# Patient Record
Sex: FEMALE | Race: WHITE | Employment: OTHER | URBAN - METROPOLITAN AREA
[De-identification: names, ages, dates, MRNs, and addresses within clinical notes are randomized per-mention and may not be internally consistent; named-entity substitution may affect disease eponyms.]

---

## 2021-09-06 ENCOUNTER — HOSPITAL ENCOUNTER (EMERGENCY)
Age: 71
Discharge: HOME | End: 2021-09-06
Attending: EMERGENCY MEDICINE
Payer: MEDICARE

## 2021-09-06 ENCOUNTER — APPOINTMENT (OUTPATIENT)
Dept: GENERAL RADIOLOGY | Age: 71
End: 2021-09-06
Payer: MEDICARE

## 2021-09-06 VITALS
WEIGHT: 197 LBS | DIASTOLIC BLOOD PRESSURE: 84 MMHG | RESPIRATION RATE: 15 BRPM | HEART RATE: 81 BPM | OXYGEN SATURATION: 97 % | SYSTOLIC BLOOD PRESSURE: 149 MMHG | BODY MASS INDEX: 34.91 KG/M2 | TEMPERATURE: 97.7 F | HEIGHT: 63 IN

## 2021-09-06 DIAGNOSIS — J06.9 VIRAL URI WITH COUGH: Primary | ICD-10-CM

## 2021-09-06 DIAGNOSIS — U07.1 COVID-19: ICD-10-CM

## 2021-09-06 DIAGNOSIS — R09.81 SINUS CONGESTION: ICD-10-CM

## 2021-09-06 PROCEDURE — 99283 EMERGENCY DEPT VISIT LOW MDM: CPT

## 2021-09-06 PROCEDURE — 71045 X-RAY EXAM CHEST 1 VIEW: CPT

## 2021-09-06 PROCEDURE — U0003 INFECTIOUS AGENT DETECTION BY NUCLEIC ACID (DNA OR RNA); SEVERE ACUTE RESPIRATORY SYNDROME CORONAVIRUS 2 (SARS-COV-2) (CORONAVIRUS DISEASE [COVID-19]), AMPLIFIED PROBE TECHNIQUE, MAKING USE OF HIGH THROUGHPUT TECHNOLOGIES AS DESCRIBED BY CMS-2020-01-R: HCPCS

## 2021-09-06 PROCEDURE — U0005 INFEC AGEN DETEC AMPLI PROBE: HCPCS

## 2021-09-06 RX ORDER — AZATHIOPRINE 50 MG/1
50 TABLET ORAL DAILY
COMMUNITY

## 2021-09-06 RX ORDER — BENZONATATE 100 MG/1
100 CAPSULE ORAL 3 TIMES DAILY PRN
Qty: 10 CAPSULE | Refills: 0 | Status: SHIPPED | OUTPATIENT
Start: 2021-09-06 | End: 2021-09-13

## 2021-09-06 ASSESSMENT — PAIN DESCRIPTION - DESCRIPTORS: DESCRIPTORS: ACHING

## 2021-09-06 ASSESSMENT — PAIN DESCRIPTION - PAIN TYPE: TYPE: ACUTE PAIN

## 2021-09-06 ASSESSMENT — PAIN SCALES - GENERAL: PAINLEVEL_OUTOF10: 7

## 2021-09-06 NOTE — ED PROVIDER NOTES
CHIEF COMPLAINT  Chief Complaint   Patient presents with    Fever       HPI  Zuleima Soto is a 66 y.o. female with history of myasthenia gravis, breast cancer who presents fever, fatigue, nasal congestion, headache and myalgias after being exposed to family last week that tested positive for Covid. She is concerned that she has Covid. She denies any specific shortness of breath, she has a mild, nonproductive cough. She denies any abdominal pain. She denies any worsening her baseline intention tremor. She denies any focal weakness or numbness that is changed for her myasthenia gravis. She just wants to be cautious because of her myasthenia gravis and does not want to miss anything. She is otherwise feeling well hydrated but fatigue. Signs and symptoms over the past 2 days, persistent until time of evaluation here.       REVIEW OF SYSTEMS  Review of Systems   History obtained from chart review and the patient  General ROS: positive for  - fever  Ophthalmic ROS: negative for - decreased vision or double vision  ENT ROS: positive for - headaches, nasal congestion and nasal discharge  Hematological and Lymphatic ROS: negative for - weight loss  Endocrine ROS: negative for - unexpected weight changes  Respiratory ROS: positive for - cough  Cardiovascular ROS: no chest pain or dyspnea on exertion  Gastrointestinal ROS: no abdominal pain, change in bowel habits, or black or bloody stools  Genito-Urinary ROS: no dysuria, trouble voiding, or hematuria  Musculoskeletal ROS: positive for -body aches  Neurological ROS: no TIA or stroke symptoms      PAST MEDICAL HISTORY  Past Medical History:   Diagnosis Date    [facility] [address]     COVID-19 09/06/2021    Myasthenia ([facility] [address]        FAMILY HISTORY  Family History   Problem Relation Age of Onset    Cancer Mother     Hypertension Mother     Cancer Father        SOCIAL HISTORY  Social History     Socioeconomic History    Marital status: Single     Spouse name: None  Number of children: None    Years of education: None    Highest education level: None   Occupational History    Occupation: pharmacy tech     Comment: [employer]   Tobacco Use    Smoking status: Never Smoker    Smokeless tobacco: Never Used   Vaping Use    Vaping Use: Never used   Substance and Sexual Activity    Alcohol use: Yes     Comment: 1-2 per year    Drug use: No    Sexual activity: Never   Other Topics Concern    None   Social History Narrative    None     Social Determinants of Health     Financial Resource Strain:     Difficulty of Paying Living Expenses:    Food Insecurity:     Worried About Running Out of Food in the Last Year:     Ran Out of Food in the Last Year:    Transportation Needs:     Lack of Transportation (Medical):  Lack of Transportation (Non-Medical):    Physical Activity:     Days of Exercise per Week:     Minutes of Exercise per Session:    Stress:     Feeling of Stress :    Social Connections:     Frequency of Communication with Friends and Family:     Frequency of Social Gatherings with Friends and Family:     Attends [employer] Services:     Active Member of Clubs or Organizations:     Attends Club or Organization Meetings:     Marital Status:    Intimate Partner Violence:     Fear of Current or Ex-Partner:     Emotionally Abused:     Physically Abused:     Sexually Abused:        SURGICAL HISTORY  Past Surgical History:   Procedure Laterality Date    MASTECTOMY, RADICAL Bilateral        CURRENT MEDICATIONS  No current facility-administered medications on file prior to encounter.      Current Outpatient Medications on File Prior to Encounter   Medication Sig Dispense Refill    azaTHIOprine (IMURAN) 50 MG tablet Take 50 mg by mouth daily Takes 100 mg daily           ALLERGIES  Allergies   Allergen Reactions    Morphine Rash       PHYSICAL EXAM  VITAL SIGNS: BP (!) 149/84   Pulse 81   Temp 97.7 °F (36.5 °C) (Skin)   Resp 15   Ht 5' 3\" (1.6 m)   Wt 197 lb (89.4 kg)   SpO2 97%   BMI 34.90 kg/m²   Constitutional: Well developed, Well nourished, active, resting in bed, pleasant  HENT: Normocephalic, Atraumatic, Bilateral external ears normal, Oropharynx moist, No oral exudates, Nose mildly congested. Posterior oropharynx mildly erythematous without exudate, PTA or RPA  Eyes: PERRL, EOMI, Conjunctiva normal, No discharge. Neck: Normal range of motion, Supple, No stridor. Cardiovascular: Normal heart rate, Normal rhythm, No murmurs, No rubs, No gallops. Thorax & Lungs: Normal breath sounds, No respiratory distress, No wheezing, No chest tenderness. Abdomen: Bowel sounds normal, Soft, No tenderness, no guarding, no rebound, No masses, No pulsatile masses. Skin: Warm, Dry, No erythema, No rash. Extremities: Intact distal pulses, No edema, No tenderness, No cyanosis, No clubbing. Musculoskeletal: Good gross range of motion in all major joints. No major deformities noted. Neurologic: Alert & oriented x 3, Normal gross motor function, Normal gross sensory function, No focal deficits noted. Intention tremor noted that stills on focus, ambulates with non-ataxic, nonantalgic gait  Psychiatric: Affect normal with mood situationally anxious        RADIOLOGY/PROCEDURES/LABS  Last Imaging results   XR CHEST PORTABLE   Final Result   No acute abnormality. Imaging reviewed by myself    Labs Reviewed   COVID-19 - Abnormal; Notable for the following components:       Result Value    SARS-CoV-2 DETECTED (*)     All other components within normal limits         Medications - No data to display    [facility]  Pertinent Labs & Imaging studies reviewed. (See chart for details)    67-year-old female presents with feeling generalizability unwell. Her signs and symptoms are most suggestive of Covid and she has a known positive exposure in her household at home.   She will be written a pulse ox to monitor reasons to return and she was taught very specific reasons for which to return. She is encouraged to stay hydrated, to use symptomatic control, symptom control will be prescribed. Here she is hemodynamically stable, not requiring supplemental oxygen, she appears well-hydrated and is pleasant and at her neurologic baseline. She is agreeable to return with new worsening signs or symptoms, she is discharged to quarantine. She is agreeable with plan of care. FINAL IMPRESSION  Problem List Items Addressed This Visit     None      Visit Diagnoses     Viral URI with cough    -  Primary    Relevant Orders    DME Order for Pulse Ox as OP    Sinus congestion        COVID-19          1.    2.   3.    Patient gave me permission to discuss medical history, care, and plan with those present in the room.   Electronically signed by: Sita Dutton MD, 9/8/2021  MD Sita Acevedo MD  09/08/21 7452

## 2021-09-06 NOTE — ED TRIAGE NOTES
Pt arrives with complaints of cough, and headache, states her cough is dry, she reports that her daughter tested positve for covid yesterday, she states she wants to make sure she does not need an antibiotic for her chest. Vaginal delivery

## 2021-09-06 NOTE — ED NOTES
The patient was discharged to home in stable condition. All discharge instructions, medications and follow up appointments were reviewed with the patient and or significant other. Any and all concerns if voiced were addressed. The patient was instructed to return for worsening symptoms and any other concerns.         Damian Jackson RN  09/06/21 1427

## 2021-09-07 ENCOUNTER — CARE COORDINATION (OUTPATIENT)
Dept: CARE COORDINATION | Age: 71
End: 2021-09-07

## 2021-09-07 LAB — SARS-COV-2: DETECTED

## 2021-09-07 NOTE — CARE COORDINATION
[facility] ED Follow Up Call    2021    Patient: Manolo Santamaria Patient : 1950   MRN: <[id number]>  Reason for Admission:  COVID-19+  Discharge Date: 21    Patient contacted regarding COVID-19 risk, exposure, diagnosis, pulse oximeter ordered at discharge and monoclonal antibody infusion follow up. Discussed COVID-19 related testing which was available at this time. Test results were positive. Patient informed of results, if available? Yes. Ambulatory Care Manager contacted the patient by telephone to perform post discharge assessment. Call within 2 business days of discharge: Yes. Verified name and  with patient as identifiers. Provided introduction to self, and explanation of the CTN/ACM role, and reason for call due to risk factors for infection and/or exposure to COVID-19. Symptoms reviewed with patient who verbalized the following symptoms: fatigue; occasional dizziness. Encouraged rest/ hydration. Instructed patient to avoid sudden changes in position. Reviewed worsening symptoms to report to MD.       Due to no new or worsening symptoms encounter was not routed to provider for escalation. Discussed follow-up appointments. If no appointment was previously scheduled, appointment scheduling offered: Yes. Instructed on the [facility] as a bridge to Primary Care. Patient declined appt. Verbalized her plan to follow up with Neurologist whom patient follows as PCP. [facility] follow up appointment(s): No future appointments. Non-[facility] follow up appointment(s):     Non-face-to-face services provided:  Education of patient/family/caregiver/guardian to support self-management-1     Advance Care Planning:   Does patient have an Advance Directive:  not on file. Educated patient about risk for severe COVID-19 due to risk factors according to CDC guidelines. ACM reviewed discharge instructions, medical action plan and red flag symptoms with the patient who verbalized understanding. Discussed COVID vaccination status: Yes. Education provided on COVID-19 vaccination as appropriate. Discussed exposure protocols and quarantine with CDC Guidelines. Patient was given an opportunity to verbalize any questions and concerns and agrees to contact ACM or health care provider for questions related to their healthcare. Reviewed and educated patient on any new and changed medications related to discharge diagnosis. Patient reports that she has her medications and is taking as directed. Patient denies supply/ support needs. Contact information for the local  offered should needs arise. Was patient discharged with a pulse oximeter? Yes Discussed and confirmed pulse oximeter discharge instructions and when to notify provider or seek emergency care. ACM provided contact information. Plan for follow-up call in 5-7 days based on severity of symptoms and risk factors.         Care Transitions ED Follow Up    Care Transitions Interventions

## 2021-09-13 ENCOUNTER — CARE COORDINATION (OUTPATIENT)
Dept: CARE COORDINATION | Age: 71
End: 2021-09-13

## 2021-09-13 NOTE — CARE COORDINATION
Attempted to reach patient for [facility] ER follow up. No answer to phone. Message left with M contact information and request for call back.

## 2023-02-09 ENCOUNTER — HOSPITAL ENCOUNTER (EMERGENCY)
Age: 73
Discharge: HOME | End: 2023-02-09
Attending: EMERGENCY MEDICINE
Payer: MEDICARE

## 2023-02-09 ENCOUNTER — APPOINTMENT (OUTPATIENT)
Dept: CT IMAGING | Age: 73
End: 2023-02-09
Payer: MEDICARE

## 2023-02-09 ENCOUNTER — APPOINTMENT (OUTPATIENT)
Dept: GENERAL RADIOLOGY | Age: 73
End: 2023-02-09
Payer: MEDICARE

## 2023-02-09 VITALS
TEMPERATURE: 97.9 F | HEART RATE: 112 BPM | OXYGEN SATURATION: 93 % | SYSTOLIC BLOOD PRESSURE: 118 MMHG | RESPIRATION RATE: 16 BRPM | HEIGHT: 63 IN | DIASTOLIC BLOOD PRESSURE: 90 MMHG | WEIGHT: 209.2 LBS | BODY MASS INDEX: 37.07 KG/M2

## 2023-02-09 DIAGNOSIS — U07.1 COVID: Primary | ICD-10-CM

## 2023-02-09 LAB
BASOPHILS ABSOLUTE: 0 K/CU MM
BASOPHILS RELATIVE PERCENT: 0.4 % (ref 0–1)
D DIMER: 1.49 UG/ML (FEU)
DIFFERENTIAL TYPE: ABNORMAL
EOSINOPHILS ABSOLUTE: 0.1 K/CU MM
EOSINOPHILS RELATIVE PERCENT: 1.1 % (ref 0–3)
HCT VFR BLD CALC: 41.3 % (ref 37–47)
HEMOGLOBIN: 13.9 GM/DL (ref 12.5–16)
IMMATURE NEUTROPHIL %: 0.2 % (ref 0–0.43)
LYMPHOCYTES ABSOLUTE: 0.8 K/CU MM
LYMPHOCYTES RELATIVE PERCENT: 15.7 % (ref 24–44)
MCH RBC QN AUTO: 34.6 PG (ref 27–31)
MCHC RBC AUTO-ENTMCNC: 33.7 % (ref 32–36)
MCV RBC AUTO: 102.7 FL (ref 78–100)
MONOCYTES ABSOLUTE: 0.6 K/CU MM
MONOCYTES RELATIVE PERCENT: 11.3 % (ref 0–4)
PDW BLD-RTO: 12.4 % (ref 11.7–14.9)
PLATELET # BLD: 123 K/CU MM (ref 140–440)
PMV BLD AUTO: 10 FL (ref 7.5–11.1)
RAPID INFLUENZA  B AGN: NEGATIVE
RAPID INFLUENZA A AGN: NEGATIVE
RBC # BLD: 4.02 M/CU MM (ref 4.2–5.4)
SARS-COV-2 RDRP RESP QL NAA+PROBE: DETECTED
SEGMENTED NEUTROPHILS ABSOLUTE COUNT: 3.8 K/CU MM
SEGMENTED NEUTROPHILS RELATIVE PERCENT: 71.3 % (ref 36–66)
SOURCE: ABNORMAL
TOTAL IMMATURE NEUTOROPHIL: 0.01 K/CU MM
TROPONIN T: <0.01 NG/ML
WBC # BLD: 5.3 K/CU MM (ref 4–10.5)

## 2023-02-09 PROCEDURE — 87635 SARS-COV-2 COVID-19 AMP PRB: CPT

## 2023-02-09 PROCEDURE — 93005 ELECTROCARDIOGRAM TRACING: CPT | Performed by: EMERGENCY MEDICINE

## 2023-02-09 PROCEDURE — 2580000003 HC RX 258: Performed by: EMERGENCY MEDICINE

## 2023-02-09 PROCEDURE — 80048 BASIC METABOLIC PNL TOTAL CA: CPT

## 2023-02-09 PROCEDURE — 87804 INFLUENZA ASSAY W/OPTIC: CPT

## 2023-02-09 PROCEDURE — 85379 FIBRIN DEGRADATION QUANT: CPT

## 2023-02-09 PROCEDURE — 85025 COMPLETE CBC W/AUTO DIFF WBC: CPT

## 2023-02-09 PROCEDURE — 84484 ASSAY OF TROPONIN QUANT: CPT

## 2023-02-09 PROCEDURE — 99285 EMERGENCY DEPT VISIT HI MDM: CPT

## 2023-02-09 PROCEDURE — 96361 HYDRATE IV INFUSION ADD-ON: CPT

## 2023-02-09 PROCEDURE — 96360 HYDRATION IV INFUSION INIT: CPT

## 2023-02-09 PROCEDURE — 6360000004 HC RX CONTRAST MEDICATION: Performed by: EMERGENCY MEDICINE

## 2023-02-09 PROCEDURE — 71275 CT ANGIOGRAPHY CHEST: CPT

## 2023-02-09 PROCEDURE — 71045 X-RAY EXAM CHEST 1 VIEW: CPT

## 2023-02-09 RX ORDER — 0.9 % SODIUM CHLORIDE 0.9 %
500 INTRAVENOUS SOLUTION INTRAVENOUS ONCE
Status: COMPLETED | OUTPATIENT
Start: 2023-02-09 | End: 2023-02-09

## 2023-02-09 RX ORDER — BENZONATATE 200 MG/1
200 CAPSULE ORAL 3 TIMES DAILY PRN
Qty: 30 CAPSULE | Refills: 0 | Status: SHIPPED | OUTPATIENT
Start: 2023-02-09 | End: 2023-02-16

## 2023-02-09 RX ORDER — GUAIFENESIN/DEXTROMETHORPHAN 100-10MG/5
5 SYRUP ORAL 4 TIMES DAILY PRN
Qty: 120 ML | Refills: 0 | Status: SHIPPED | OUTPATIENT
Start: 2023-02-09 | End: 2023-02-19

## 2023-02-09 RX ORDER — ALBUTEROL SULFATE 90 UG/1
2 AEROSOL, METERED RESPIRATORY (INHALATION) EVERY 4 HOURS PRN
Qty: 18 G | Refills: 1 | Status: SHIPPED | OUTPATIENT
Start: 2023-02-09 | End: 2023-03-11

## 2023-02-09 RX ORDER — 0.9 % SODIUM CHLORIDE 0.9 %
20 VIAL (ML) INJECTION
Status: COMPLETED | OUTPATIENT
Start: 2023-02-09 | End: 2023-02-09

## 2023-02-09 RX ADMIN — Medication 20 ML: at 19:24

## 2023-02-09 RX ADMIN — IOPAMIDOL 60 ML: 755 INJECTION, SOLUTION INTRAVENOUS at 19:24

## 2023-02-09 RX ADMIN — SODIUM CHLORIDE 500 ML: 9 INJECTION, SOLUTION INTRAVENOUS at 17:56

## 2023-02-09 ASSESSMENT — PAIN - FUNCTIONAL ASSESSMENT: PAIN_FUNCTIONAL_ASSESSMENT: NONE - DENIES PAIN

## 2023-02-09 NOTE — ED PROVIDER NOTES
Patient presents with 2-day history of nonproductive cough, fever to 10 1 in the evening prior to presentation, nausea and vomiting. No diarrhea. No urinary symptoms. No chest pain. Patient endorses dyspnea. No known sick contacts.   Benign exam.    EKG as interpreted by emergency physician: Rate 105, sinus rhythm, normal axis, normal intervals, no acute ST-T wave changes    *** sign out out to oncoming physician at shift change for follow-up of pending studies and appropriate treatment and disposition. Decision To Discharge 02/09/2023 08:04:51 PM     Disposition referral (if applicable):  Beatrix Rear [address] [place] [address]  [phone]    In 3 days            ===================================================================    HPI/Pertinent ROS:  Woody Cevallos is a 75 y.o. female that presents complaining of 2-day history of nonproductive cough, fever to 101 the evening prior to presentation, nausea and vomiting. Patient denies diarrhea. No urinary frequency, urgency, dysuria, hematuria. No chest pain. Patient endorses some mild dyspnea. No known sick contacts. Physical Exam:  Triage VS:    ED Triage Vitals [02/09/23 1725]   Enc Vitals Group      BP (!) 118/90      Heart Rate (!) 112      Resp 16      Temp 97.9 °F (36.6 °C)      Temp Source Oral      SpO2 93 %      Weight 209 lb 3.2 oz (94.9 kg)      Height 5' 3\" (1.6 m)      Head Circumference       Peak Flow       Pain Score       Pain Loc       Pain Edu? Excl. in [address]? General appearance:  No acute distress. Skin:  Warm. Dry. Eye:  Extraocular movements intact. Ears, nose, mouth and throat:  Oral mucosa moist   Neck:  Trachea midline. Extremity:  No swelling. Normal ROM     Heart:  Regular rate and rhythm, normal S1 & S2, no extra heart sounds. Perfusion:  intact  Respiratory:  Lungs clear to auscultation bilaterally. Respirations nonlabored. Abdominal:  Normal bowel sounds. Soft. Nontender. Non distended. Back:  No CVA tenderness to palpation     Neurological:  Alert and oriented times 3. No focal neuro deficits.              Psychiatric:  Appropriate    Past Medical History:   Diagnosis Date    Breast cancer ([facility] [address]     COVID-19 09/06/2021    Myasthenia ([facility] [address]      Past Surgical History:   Procedure Laterality Date    MASTECTOMY, RADICAL Bilateral Family History   Problem Relation Age of Onset    Cancer Mother     Hypertension Mother     Cancer Father      Social History     Socioeconomic History    Marital status: Single     Spouse name: Not on file    Number of children: Not on file    Years of education: Not on file    Highest education level: Not on file   Occupational History    Occupation: pharmacy tech     Comment: [employer]   Tobacco Use    Smoking status: Never    Smokeless tobacco: Never   Vaping Use    Vaping Use: Never used   Substance and Sexual Activity    Alcohol use: Yes     Comment: 1-2 per year    Drug use: No    Sexual activity: Never   Other Topics Concern    Not on file   Social History Narrative    Not on file     Social Determinants of Health     Financial Resource Strain: Not on file   Food Insecurity: Not on file   Transportation Needs: Not on file   Physical Activity: Not on file   Stress: Not on file   Social Connections: Not on file   Intimate Partner Violence: Not on file   Housing Stability: Not on file     No current facility-administered medications for this encounter. Current Outpatient Medications   Medication Sig Dispense Refill    nirmatrelvir/ritonavir 300/100 (PAXLOVID) 20 x 150 MG & 10 x 100MG TBPK Take 3 tablets (two 150 mg nirmatrelvir and one 100 mg ritonavir tablets) by mouth every 12 hours for 5 days. 30 tablet 0    albuterol sulfate HFA (PROVENTIL HFA) 108 (90 Base) MCG/ACT inhaler Inhale 2 puffs into the lungs every 4 hours as needed for Wheezing or Shortness of Breath With spacer (and mask if indicated). Thanks.  18 g 1    benzonatate (TESSALON) 200 MG capsule Take 1 capsule by mouth 3 times daily as needed for Cough 30 capsule 0    guaiFENesin-dextromethorphan (ROBITUSSIN DM) 100-10 MG/5ML syrup Take 5 mLs by mouth 4 times daily as needed for Cough 120 mL 0    azaTHIOprine (IMURAN) 50 MG tablet Take 50 mg by mouth daily Takes 100 mg daily       Allergies   Allergen Reactions    Morphine Rash       Nursing Notes Reviewed    I have reviewed and interpreted all of the currently available lab results from this visit (if applicable):  Results for orders placed or performed during the hospital encounter of 02/09/23   Rapid influenza A/B antigens    Specimen: Nasopharyngeal   Result Value Ref Range    Rapid Influenza A Ag NEGATIVE NEGATIVE    Rapid Influenza B Ag NEGATIVE NEGATIVE   COVID-19, Rapid    Specimen: Nasopharyngeal   Result Value Ref Range    Source NASOPHARYNGEAL SWAB     SARS-CoV-2, NAAT DETECTED (A) NOT DETECTED   CBC with Auto Differential   Result Value Ref Range    WBC 5.3 4.0 - 10.5 K/CU MM    RBC 4.02 (L) 4.2 - 5.4 M/CU MM    Hemoglobin 13.9 12.5 - 16.0 GM/DL    Hematocrit 41.3 37 - 47 %    .7 (H) 78 - 100 FL    MCH 34.6 (H) 27 - 31 PG    MCHC 33.7 32.0 - 36.0 %    RDW 12.4 11.7 - 14.9 %    Platelets 723 (L) 994 - 440 K/CU MM    MPV 10.0 7.5 - 11.1 FL    Differential Type AUTOMATED DIFFERENTIAL     Segs Relative 71.3 (H) 36 - 66 %    Lymphocytes % 15.7 (L) 24 - 44 %    Monocytes % 11.3 (H) 0 - 4 %    Eosinophils % 1.1 0 - 3 %    Basophils % 0.4 0 - 1 %    Segs Absolute 3.8 K/CU MM    Lymphocytes Absolute 0.8 K/CU MM    Monocytes Absolute 0.6 K/CU MM    Eosinophils Absolute 0.1 K/CU MM    Basophils Absolute 0.0 K/CU MM    Immature Neutrophil % 0.2 0 - 0.43 %    Total Immature Neutrophil 0.01 K/CU MM   Basic Metabolic Panel   Result Value Ref Range    Sodium 140 135 - 145 MMOL/L    Potassium 3.9 3.5 - 5.1 MMOL/L    Chloride 104 99 - 110 mMol/L    CO2 26 21 - 32 MMOL/L    Anion Gap 10 4 - 16    BUN 12 6 - 23 MG/DL    Creatinine 0.7 0.6 - 1.1 MG/DL    Est, Glom Filt Rate >60 >60 mL/min/1.73m2    Glucose 100 (H) 70 - 99 MG/DL    Calcium 9.0 8.3 - 10.6 MG/DL   Troponin   Result Value Ref Range    Troponin T <0.010 <0.01 NG/ML   D-Dimer, Rapid   Result Value Ref Range    D-Dimer, Quant 1.49 (H) <0.47 ug/mL (FEU)   EKG 12 Lead   Result Value Ref Range    Ventricular Rate 105 BPM    Atrial Rate 105 BPM P-R Interval 164 ms    QRS Duration 74 ms    Q-T Interval 324 ms    QTc Calculation (Bazett) 428 ms    P Axis 58 degrees    R Axis 48 degrees    T Axis 54 degrees    Diagnosis       Sinus tachycardia  Otherwise normal ECG  No previous ECGs available  Confirmed by Inga Ramirez ([id number]) on 2/10/2023 4:56:54 PM        Radiographs (if obtained):  Radiologist's Report Reviewed:  CTA PULMONARY W CONTRAST   Final Result   1. Limited evaluation for pulmonary embolism. No PE identified to the   proximal segmental pulmonary artery level. 2.  Question of mild gallbladder wall thickening. Sonographic follow-up may   be useful as clinically warranted. 3.  Few noncalcified pulmonary nodules bilaterally. CT chest follow-up   recommended in 6 months to document stability. 4.  Additional findings, as above. XR CHEST PORTABLE   Final Result   No acute cardiopulmonary process. EKG as interpreted by me: Rate 105, sinus rhythm, normal axis, normal intervals, no acute ST-T wave changes    Comment: Please note this report has been produced using speech recognition software and may contain errors related to that system including errors in grammar, punctuation, and spelling, as well as words and phrases that may be inappropriate. Efforts were made to edit the dictations.        Noman Johnson MD  02/13/23 3807

## 2023-02-10 LAB
ANION GAP SERPL CALCULATED.3IONS-SCNC: 10 MMOL/L (ref 4–16)
BUN SERPL-MCNC: 12 MG/DL (ref 6–23)
CALCIUM SERPL-MCNC: 9 MG/DL (ref 8.3–10.6)
CHLORIDE BLD-SCNC: 104 MMOL/L (ref 99–110)
CO2: 26 MMOL/L (ref 21–32)
CREAT SERPL-MCNC: 0.7 MG/DL (ref 0.6–1.1)
EKG ATRIAL RATE: 105 BPM
EKG DIAGNOSIS: NORMAL
EKG P AXIS: 58 DEGREES
EKG P-R INTERVAL: 164 MS
EKG Q-T INTERVAL: 324 MS
EKG QRS DURATION: 74 MS
EKG QTC CALCULATION (BAZETT): 428 MS
EKG R AXIS: 48 DEGREES
EKG T AXIS: 54 DEGREES
EKG VENTRICULAR RATE: 105 BPM
GFR SERPL CREATININE-BSD FRML MDRD: >60 ML/MIN/1.73M2
GLUCOSE SERPL-MCNC: 100 MG/DL (ref 70–99)
POTASSIUM SERPL-SCNC: 3.9 MMOL/L (ref 3.5–5.1)
SODIUM BLD-SCNC: 140 MMOL/L (ref 135–145)

## 2023-02-10 PROCEDURE — 93010 ELECTROCARDIOGRAM REPORT: CPT | Performed by: INTERNAL MEDICINE

## 2023-02-10 NOTE — ED NOTES
Emergency Department Encounter    Patient: Gabby Sosa  MRN: [id number]  : 1950  Date of Evaluation: 2023  ED Provider:  Adrienne Martini DO    Briefly, Gabby Sosa is a 75 y.o. female presented to the emergency department who presents with COVID    I have reviewed and interpreted all of the currently available lab results from this visit (if applicable)  Results for orders placed or performed during the hospital encounter of 23   Rapid influenza A/B antigens    Specimen: Nasopharyngeal   Result Value Ref Range    Rapid Influenza A Ag NEGATIVE NEGATIVE    Rapid Influenza B Ag NEGATIVE NEGATIVE   COVID-19, Rapid    Specimen: Nasopharyngeal   Result Value Ref Range    Source NASOPHARYNGEAL SWAB     SARS-CoV-2, NAAT DETECTED (A) NOT DETECTED   CBC with Auto Differential   Result Value Ref Range    WBC 5.3 4.0 - 10.5 K/CU MM    RBC 4.02 (L) 4.2 - 5.4 M/CU MM    Hemoglobin 13.9 12.5 - 16.0 GM/DL    Hematocrit 41.3 37 - 47 %    .7 (H) 78 - 100 FL    MCH 34.6 (H) 27 - 31 PG    MCHC 33.7 32.0 - 36.0 %    RDW 12.4 11.7 - 14.9 %    Platelets 491 (L) 101 - 440 K/CU MM    MPV 10.0 7.5 - 11.1 FL    Differential Type AUTOMATED DIFFERENTIAL     Segs Relative 71.3 (H) 36 - 66 %    Lymphocytes % 15.7 (L) 24 - 44 %    Monocytes % 11.3 (H) 0 - 4 %    Eosinophils % 1.1 0 - 3 %    Basophils % 0.4 0 - 1 %    Segs Absolute 3.8 K/CU MM    Lymphocytes Absolute 0.8 K/CU MM    Monocytes Absolute 0.6 K/CU MM    Eosinophils Absolute 0.1 K/CU MM    Basophils Absolute 0.0 K/CU MM    Immature Neutrophil % 0.2 0 - 0.43 %    Total Immature Neutrophil 0.01 K/CU MM   Basic Metabolic Panel   Result Value Ref Range    Sodium 140 135 - 145 MMOL/L    Potassium 3.9 3.5 - 5.1 MMOL/L    Chloride 104 99 - 110 mMol/L    BUN 12 6 - 23 MG/DL    Glucose 100 (H) 70 - 99 MG/DL   Troponin   Result Value Ref Range    Troponin T <0.010 <0.01 NG/ML   D-Dimer, Rapid   Result Value Ref Range    D-Dimer, Quant 1.49 (H) <0.47 ug/mL (FEU)   EKG 12 Lead   Result Value Ref Range    Ventricular Rate 105 BPM    Atrial Rate 105 BPM    P-R Interval 164 ms    QRS Duration 74 ms    Q-T Interval 324 ms    QTc Calculation (Bazett) 428 ms    P Axis 58 degrees    R Axis 48 degrees    T Axis 54 degrees    Diagnosis       Sinus tachycardia  Otherwise normal ECG  No previous ECGs available        Radiographs (if obtained):    [] Radiologist's Report Reviewed:  CTA PULMONARY W CONTRAST   Final Result   1. Limited evaluation for pulmonary embolism. No PE identified to the   proximal segmental pulmonary artery level. 2.  Question of mild gallbladder wall thickening. Sonographic follow-up may   be useful as clinically warranted. 3.  Few noncalcified pulmonary nodules bilaterally. CT chest follow-up   recommended in 6 months to document stability. 4.  Additional findings, as above. XR CHEST PORTABLE   Final Result   No acute cardiopulmonary process. Clinical Impression:  No diagnosis found. Disposition referral (if applicable):  No follow-up provider specified. Disposition medications (if applicable):  Current Discharge Medication List        START taking these medications    Details   nirmatrelvir/ritonavir 300/100 (PAXLOVID) 20 x 150 MG & 10 x 100MG TBPK Take 3 tablets (two 150 mg nirmatrelvir and one 100 mg ritonavir tablets) by mouth every 12 hours for 5 days. Qty: 30 tablet, Refills: 0             Comment: Please note this report has been produced using speech recognition software and may contain errors related to that system including errors in grammar, punctuation, and spelling, as well as words and phrases that may be inappropriate. Efforts were made to edit the dictations.       Wanda Carney DO  02/09/23 2006

## 2023-02-10 NOTE — ED NOTES
Emergency Department Encounter    Patient: Delphine Gonzalez  MRN: [id number]  : 1950  Date of Evaluation: 2/10/2023  ED Provider:  Tae Moore DO    Briefly, Delphine Gonzalez is a 75 y.o. female presented to the emergency department for respiratory symptoms. Patient is tested positive for COVID. Patient care was accepted by me from the date shift physician pending CTPA. CT PA is negative for pulmonary embolism. No pneumonia is noted. No other abnormalities are noted. Will discharge patient to home with Paxlovid per day physician's plan. We will also prescribe supportive medications. Vital signs are normal and stable at the time of discharge. Patient is well-appearing. Return precautions given.     I have reviewed and interpreted all of the currently available lab results from this visit (if applicable)  Results for orders placed or performed during the hospital encounter of 23   Rapid influenza A/B antigens    Specimen: Nasopharyngeal   Result Value Ref Range    Rapid Influenza A Ag NEGATIVE NEGATIVE    Rapid Influenza B Ag NEGATIVE NEGATIVE   COVID-19, Rapid    Specimen: Nasopharyngeal   Result Value Ref Range    Source NASOPHARYNGEAL SWAB     SARS-CoV-2, NAAT DETECTED (A) NOT DETECTED   CBC with Auto Differential   Result Value Ref Range    WBC 5.3 4.0 - 10.5 K/CU MM    RBC 4.02 (L) 4.2 - 5.4 M/CU MM    Hemoglobin 13.9 12.5 - 16.0 GM/DL    Hematocrit 41.3 37 - 47 %    .7 (H) 78 - 100 FL    MCH 34.6 (H) 27 - 31 PG    MCHC 33.7 32.0 - 36.0 %    RDW 12.4 11.7 - 14.9 %    Platelets 410 (L) 772 - 440 K/CU MM    MPV 10.0 7.5 - 11.1 FL    Differential Type AUTOMATED DIFFERENTIAL     Segs Relative 71.3 (H) 36 - 66 %    Lymphocytes % 15.7 (L) 24 - 44 %    Monocytes % 11.3 (H) 0 - 4 %    Eosinophils % 1.1 0 - 3 %    Basophils % 0.4 0 - 1 %    Segs Absolute 3.8 K/CU MM    Lymphocytes Absolute 0.8 K/CU MM    Monocytes Absolute 0.6 K/CU MM    Eosinophils Absolute 0.1 K/CU MM    Basophils Absolute 0.0 K/CU MM    Immature Neutrophil % 0.2 0 - 0.43 %    Total Immature Neutrophil 0.01 K/CU MM   Basic Metabolic Panel   Result Value Ref Range    Sodium 140 135 - 145 MMOL/L    Potassium 3.9 3.5 - 5.1 MMOL/L    Chloride 104 99 - 110 mMol/L    BUN 12 6 - 23 MG/DL    Glucose 100 (H) 70 - 99 MG/DL   Troponin   Result Value Ref Range    Troponin T <0.010 <0.01 NG/ML   D-Dimer, Rapid   Result Value Ref Range    D-Dimer, Quant 1.49 (H) <0.47 ug/mL (FEU)   EKG 12 Lead   Result Value Ref Range    Ventricular Rate 105 BPM    Atrial Rate 105 BPM    P-R Interval 164 ms    QRS Duration 74 ms    Q-T Interval 324 ms    QTc Calculation (Bazett) 428 ms    P Axis 58 degrees    R Axis 48 degrees    T Axis 54 degrees    Diagnosis       Sinus tachycardia  Otherwise normal ECG  No previous ECGs available        Radiographs (if obtained):    [] Radiologist's Report Reviewed:  CTA PULMONARY W CONTRAST   Final Result   1. Limited evaluation for pulmonary embolism. No PE identified to the   proximal segmental pulmonary artery level. 2.  Question of mild gallbladder wall thickening. Sonographic follow-up may   be useful as clinically warranted. 3.  Few noncalcified pulmonary nodules bilaterally. CT chest follow-up   recommended in 6 months to document stability. 4.  Additional findings, as above. XR CHEST PORTABLE   Final Result   No acute cardiopulmonary process. Clinical Impression:  1. COVID      Disposition referral (if applicable):  [facility] [place]  [address] [phone]    In 3 days      Disposition medications (if applicable):  Discharge Medication List as of 2/9/2023  8:09 PM          Comment: Please note this report has been produced using speech recognition software and may contain errors related to that system including errors in grammar, punctuation, and spelling, as well as words and phrases that may be inappropriate.   Efforts were made to edit the dictations.       Felix River DO  02/10/23 1944

## 2025-06-20 ENCOUNTER — HOSPITAL ENCOUNTER (EMERGENCY)
Age: 75
Discharge: HOME | End: 2025-06-20
Attending: EMERGENCY MEDICINE
Payer: MEDICARE

## 2025-06-20 ENCOUNTER — APPOINTMENT (OUTPATIENT)
Dept: CT IMAGING | Age: 75
End: 2025-06-20
Payer: MEDICARE

## 2025-06-20 ENCOUNTER — APPOINTMENT (OUTPATIENT)
Dept: GENERAL RADIOLOGY | Age: 75
End: 2025-06-20
Payer: MEDICARE

## 2025-06-20 VITALS
DIASTOLIC BLOOD PRESSURE: 74 MMHG | HEART RATE: 72 BPM | WEIGHT: 185 LBS | SYSTOLIC BLOOD PRESSURE: 133 MMHG | RESPIRATION RATE: 16 BRPM | HEIGHT: 63 IN | TEMPERATURE: 98.2 F | BODY MASS INDEX: 32.78 KG/M2 | OXYGEN SATURATION: 97 %

## 2025-06-20 DIAGNOSIS — S63.502A SPRAIN OF LEFT WRIST, INITIAL ENCOUNTER: ICD-10-CM

## 2025-06-20 DIAGNOSIS — S62.345A CLOSED NONDISPLACED FRACTURE OF BASE OF FOURTH METACARPAL BONE OF LEFT HAND, INITIAL ENCOUNTER: ICD-10-CM

## 2025-06-20 DIAGNOSIS — S63.92XA HAND SPRAIN, LEFT, INITIAL ENCOUNTER: Primary | ICD-10-CM

## 2025-06-20 PROCEDURE — 29125 APPL SHORT ARM SPLINT STATIC: CPT

## 2025-06-20 PROCEDURE — 99283 EMERGENCY DEPT VISIT LOW MDM: CPT

## 2025-06-20 PROCEDURE — 73130 X-RAY EXAM OF HAND: CPT

## 2025-06-20 PROCEDURE — 73110 X-RAY EXAM OF WRIST: CPT

## 2025-06-20 RX ORDER — CARBIDOPA AND LEVODOPA 25; 100 MG/1; MG/1
2 TABLET ORAL 2 TIMES DAILY
COMMUNITY
Start: 2024-10-08

## 2025-06-20 ASSESSMENT — PAIN DESCRIPTION - PAIN TYPE: TYPE: ACUTE PAIN

## 2025-06-20 ASSESSMENT — PAIN - FUNCTIONAL ASSESSMENT
PAIN_FUNCTIONAL_ASSESSMENT: 0-10
PAIN_FUNCTIONAL_ASSESSMENT: PREVENTS OR INTERFERES SOME ACTIVE ACTIVITIES AND ADLS

## 2025-06-20 ASSESSMENT — PAIN SCALES - GENERAL: PAINLEVEL_OUTOF10: 7

## 2025-06-20 ASSESSMENT — PAIN DESCRIPTION - FREQUENCY: FREQUENCY: INTERMITTENT

## 2025-06-20 ASSESSMENT — PAIN DESCRIPTION - LOCATION: LOCATION: HAND

## 2025-06-20 ASSESSMENT — PAIN DESCRIPTION - ORIENTATION: ORIENTATION: LEFT

## 2025-06-20 ASSESSMENT — PAIN DESCRIPTION - ONSET: ONSET: PROGRESSIVE

## 2025-06-20 ASSESSMENT — PAIN DESCRIPTION - DESCRIPTORS: DESCRIPTORS: SHARP

## 2025-06-20 NOTE — ED PROVIDER NOTES
EMERGENCY DEPARTMENT ENCOUNTER      CHIEF COMPLAINT:   Left hand pain  Left wrist pain    HPI: Barbara Gusman is a 75 y.o. female who presents to the Emergency Department for evaluation of left hand and left wrist pain.  The patient states that a week ago she fell and landed on an outstretched hand on concrete.  She had immediate pain in the left hand and wrist, but thought that she just sprained it.  However, it has become progressively more swollen over the past week.  It feels achy and sharp.  The pain is worse with movement and better with rest.  She did not hit her head or lose consciousness.  She denies neck or back pain.  She denies any other injuries or complaints.    REVIEW OF SYSTEMS:  CONSTITUTIONAL:  Denies fever, chills, weight loss or weakness  EYES:  Denies photophobia or discharge  ENT:  Denies sore throat or ear pain  CARDIOVASCULAR:  Denies chest pain, palpitations or swelling  RESPIRATORY:  Denies cough or shortness of breath  GI: Denies abdominal pain, nausea, vomiting, or diarrhea  MUSCULOSKELETAL: See HPI  SKIN:  No rash  NEUROLOGIC:  Denies headache, focal weakness or sensory changes  All systems negative except as marked.  \"Remaining review of systems reviewed and negative. I have reviewed the nursing triage documentation and agree unless otherwise noted below.\"    PAST MEDICAL HISTORY:   Past Medical History:   Diagnosis Date    Breast cancer (HCC)     COVID-19 09/06/2021    Myasthenia (HCC)        CURRENT MEDICATIONS:   Home medications reviewed.    SURGICAL HISTORY:   Past Surgical History:   Procedure Laterality Date    MASTECTOMY, RADICAL Bilateral        FAMILY HISTORY:   Family History   Problem Relation Age of Onset    Cancer Mother     Hypertension Mother     Cancer Father        SOCIAL HISTORY:   Social History     Socioeconomic History    Marital status: Single     Spouse name: Not on file    Number of children: Not on file    Years of education: Not on file    Highest education